# Patient Record
Sex: MALE | Race: WHITE
[De-identification: names, ages, dates, MRNs, and addresses within clinical notes are randomized per-mention and may not be internally consistent; named-entity substitution may affect disease eponyms.]

---

## 2017-03-27 ENCOUNTER — HOSPITAL ENCOUNTER (EMERGENCY)
Dept: HOSPITAL 80 - FED | Age: 77
Discharge: HOME | End: 2017-03-27
Payer: COMMERCIAL

## 2017-03-27 VITALS — TEMPERATURE: 98.6 F

## 2017-03-27 VITALS
DIASTOLIC BLOOD PRESSURE: 89 MMHG | SYSTOLIC BLOOD PRESSURE: 161 MMHG | OXYGEN SATURATION: 95 % | RESPIRATION RATE: 16 BRPM | HEART RATE: 64 BPM

## 2017-03-27 DIAGNOSIS — J20.9: Primary | ICD-10-CM

## 2017-03-27 DIAGNOSIS — F17.210: ICD-10-CM

## 2017-03-27 NOTE — EDPHY
H & P


Stated Complaint: Cough,nasal congestion x 2 days


Time Seen by Provider: 03/27/17 10:59


HPI/ROS: 





CHIEF COMPLAINT:  URI symptoms x2 days





HISTORY OF PRESENT ILLNESS:  76-year-old immunocompetent male, daily smoker, no 

history of chronic pulmonary or cardiac disease complaining 2 days of nasal 

congestion, sore throat, nonproductive cough, myalgias, subjective fever.  No 

chest pain.  No back pain.  No abdominal pain. No nausea or vomiting. No rash. 

No international travel.





PRIMARY CARE PROVIDER:  Dr. Femi Metzger





REVIEW OF SYSTEMS:


A ten point review of systems was performed and is negative with the exception 

of the items mentioned in the HPI








PAST MEDICAL & SURGICAL  HISTORY:  No pertinent medical or surgical history    





SOCIAL HISTORY:  Daily cigarette smoker











************


PHYSICAL EXAM





(Prior to examination, patient consented to physical exam, hands were washed 

and my usual and customary physical exam procedures followed)


1) GENERAL: Well-developed, well-nourished, alert and oriented.  Appears 

nontoxic .


2) HEAD: Normocephalic, atraumatic


3) HEENT: Pupils equal, round, reactive to light bilaterally.  Sclera 

anicteric.  Nasopharynx, oropharynx, clear, no lesions. No tonsillar 

enlargement or tonsillar exudate uvula midline. Ears bilaterally with normal 

tympanic membranes.


4) NECK: Full range of motion, no meningeal signs.


5) LUNGS: Clear auscultation bilaterally, no wheezes, no rhonchi, no 

retractions.   


6) HEART: Regular rate and rhythm, no murmur, no heave, no gallop.


7) ABDOMEN: No guarding, no rebound, no focal tenderness, negative McBurney's, 

negative Orellana's, negative Rovsing's, negative peritoneal sign,


8) MUSCULOSKELETAL: Moving all extremities, no focal areas of tenderness, no 

obvious trauma.  No peripheral edema or discoloration.


9) BACK: No CVA tenderness,. 


10) SKIN: No rash, no petechiae. 


11) Psychiatric:  Patient is oriented X 3, there is no agitation.








***************





DIFFERENTIAL DIAGNOSIS:  in no particular include but limited to pneumonia, 

bronchitis, influenza 








- Personal History


Current Tetanus Diphtheria and Acellular Pertussis (TDAP): Unsure





- Medical/Surgical History


Hx Asthma: No


Hx Chronic Respiratory Disease: No


Hx Diabetes: No


Hx Cardiac Disease: No


Hx Renal Disease: No


Hx Cirrhosis: No


Hx Alcoholism: No


Hx HIV/AIDS: No


Hx Splenectomy or Spleen Trauma: No


Other PMH: PSH: HERNIA,GALLSTONES.  PMH: GASTRIC ULCERS,PROSTATE





- Social History


Smoking Status: Current every day smoker


Constitutional: 


 Initial Vital Signs











Temperature (C)  37 C   03/27/17 10:17


 


Heart Rate  61   03/27/17 10:17


 


Respiratory Rate  20   03/27/17 10:17


 


Blood Pressure  143/99 H  03/27/17 10:17


 


O2 Sat (%)  93   03/27/17 10:17








 











O2 Delivery Mode               Room Air














Allergies/Adverse Reactions: 


 





No Known Allergies Allergy (Verified 03/27/17 10:16)


 








Home Medications: 














 Medication  Instructions  Recorded


 


AZITHROMYCIN [Z-PACK] 500 mg PO DAILY #1 packet 03/27/17


 


Albuterol [Proventil Inhaler HFA 1 - 2 puffs IH Q4PRN PRN #1 mdi 03/27/17





(*)]  


 


Benzonatate [Tessalon Pearles (RX)] 200 mg PO TID PRN #15 cap 03/27/17














Medical Decision Making





- Diagnostics


Imaging: 





Chest, Two Views - March 27, 2017 at 1103 hours 


 


 History:  Chest pain. 


 


 Comparison: October 2014 


 


 Findings: Cardiac silhouette is within normal range. Bilateral peribronchial 

thickening. 


Atherosclerotic tortuous aorta. Hyperinflation of the lungs. Increased 

interstitial markings 


throughout both lungs again noted. Sclerotic lesion in the proximal left 

humerus is stable since 


the previous study, possibly representing bone infarct or enchondroma. 


 


 


Impression: 


1. Atherosclerotic tortuous aorta. 


2. COPD. 


3. Bronchitis. 


4. Increased interstitial markings in both lungs, which may represent mild 

interstitial pulmonary 


edema or interstitial pneumonitis versus interstitial pulmonary fibrosis. 

Consider CT chest imaging 


if clinically indicated. 


 


 


               Dictated By: José Miguel Bettencourt 





Images reviewed by myself


ED Course/Re-evaluation: 





Influenza swab negative. Chest x-ray shows no definitive infiltrate.  Re-

evaluation most recently at 12:20 p.m..  He is breathing comfortably   

Maintaining normal saturations.  I think the patient can be discharged.  Will 

plan on treatment for coverage for community-acquired pneumonia .  No history 

of hospitalization recently.  Usual and customary URI precautions instructions 

provided





- Data Points


Laboratory Results: 


 











  03/27/17





  10:25


 


Influenza Typ A,B (DFA)  NEGATIVE FOR FLU 





   (NEGATIVE) 














Departure





- Departure


Disposition: Home, Routine, Self-Care


Clinical Impression: 


 Bronchitis





Condition: Good


Instructions:  Acute Bronchitis (ED)


Additional Instructions: 


 Return to the emergency department immediately for change in breathing habits, 

change in voice, change in swallowing habits, change in mental status, or any 

other symptoms that concern you.


Referrals: 


Femi Metzger MD [Primary Care Provider] - 1 day without fail


Prescriptions: 


Albuterol [Proventil Inhaler HFA (*)] 1 - 2 puffs IH Q4PRN PRN #1 mdi


 PRN Reason: Cough, Moderate


AZITHROMYCIN [Z-PACK] 500 mg PO DAILY #1 packet


Benzonatate [Tessalon Pearles (RX)] 200 mg PO TID PRN #15 cap


 PRN Reason: Cough, Moderate

## 2018-01-17 ENCOUNTER — HOSPITAL ENCOUNTER (EMERGENCY)
Dept: HOSPITAL 80 - FED | Age: 78
Discharge: HOME | End: 2018-01-17
Payer: COMMERCIAL

## 2018-01-17 VITALS
RESPIRATION RATE: 20 BRPM | OXYGEN SATURATION: 91 % | TEMPERATURE: 98.2 F | DIASTOLIC BLOOD PRESSURE: 78 MMHG | SYSTOLIC BLOOD PRESSURE: 158 MMHG

## 2018-01-17 VITALS — HEART RATE: 54 BPM

## 2018-01-17 DIAGNOSIS — J11.1: Primary | ICD-10-CM

## 2018-01-17 DIAGNOSIS — F17.200: ICD-10-CM

## 2018-01-17 DIAGNOSIS — E86.9: ICD-10-CM

## 2018-01-17 LAB — PLATELET # BLD: 125 10^3/UL (ref 150–400)

## 2018-01-17 PROCEDURE — 99284 EMERGENCY DEPT VISIT MOD MDM: CPT

## 2018-01-17 PROCEDURE — 96374 THER/PROPH/DIAG INJ IV PUSH: CPT

## 2018-01-17 PROCEDURE — 71045 X-RAY EXAM CHEST 1 VIEW: CPT

## 2018-01-17 NOTE — EDPHY
H & P


Time Seen by Provider: 01/17/18 09:17


HPI/ROS: 





Chief complaint.  Cough congestion





HPI.  77-year-old male presents emergency department with cough productive of 

phlegm.  Began yesterday.  He does not think fever.  Vomiting yesterday.  

Somewhat achy.  No chest discomfort or shortness of breath.  No abdominal pain.

  Some upper airway congestion.





ROS


Constitutional.  no fever/chills, no weakness


Eyes.  no problems with vision


ENT.  no sore throat, no nasal drainage


Cardiovascular.  no chest pain


Respiratory.  Cough


Abdominal.  Vomiting


.  no problems urinating


MS.  no calf pain/swelling, no neck/back pain, no joint pain


Skin.  no rash


Lymph.  no swollen glands


Neuro.  no headache, no dizziness, no difficulty walking or with speech


Past Medical/Surgical History: 





Past medical history significant for hernia, gallstones, peptic ulcer disease, 

possible prostate cancer


Social History: 





Single, daily smoker, no alcohol


Smoking Status: Current every day smoker


Physical Exam: 





General Appearance:  Alert well-developed male mild distress. Initial temp 

37.9degrees and O2 saturation 92% on room air


Eyes: Pupils equal and round no pallor or injection.


ENT, pharynx slightly injected without exudate.  Mucous membranes moist


Respiratory:  No retractions.  Mild inspiratory expiratory rhonchi


Cardiovascular: Regular rate and rhythm.


Gastrointestinal:   Abdomen is soft and nontender, no masses, bowel sounds 

normal.


Neurological:  Awake and alert, sensory and motor exams grossly normal.


Skin: Warm and dry, no rashes.


Musculoskeletal:  Neck is supple nontender.


Extremities  symmetrical, full range of motion.


Psychiatric: Patient is oriented X 3, there is no agitation.


Constitutional: 


 Initial Vital Signs











Temperature (C)  37.9 C   01/17/18 09:23


 


Heart Rate  54 L  01/17/18 09:23


 


Blood Pressure  155/80 H  01/17/18 09:23


 


O2 Sat (%)  92   01/17/18 09:23








 











O2 Delivery Mode               Room Air














Allergies/Adverse Reactions: 


 





No Known Allergies Allergy (Verified 03/27/17 10:16)


 








Home Medications: 














 Medication  Instructions  Recorded


 


Albuterol [Proventil Inhaler HFA 1 - 2 puffs IH Q4PRN PRN #1 mdi 03/27/17





(*)]  


 


Benzonatate [Tessalon Pearles] 200 mg PO TID PRN #15 cap 03/27/17


 


Oseltamivir Phosphate [Tamiflu 75 75 mg PO BID #10 cap 01/17/18





mg (*)]  














Medical Decision Making





- Diagnostics


Imaging Results: 


 Imaging Impressions





Chest X-Ray  01/17/18 09:26


Impression:


1. Decreased inspiration with increase in interstitial markings mid to lower 

lungs along with some prominence of pulmonary vascularity centrally and mild 

increase in heart size. Consider fluid overload/early CHF.








Chest x-ray interpreted by me shows diffuse increase in interstitial markings 

possibly secondary to CHF.  No obvious pneumonia


ED Course/Re-evaluation: 





Flu swab is positive. He is given Tamiflu in the emergency department





Patient's O2 saturation will drop down into the 86 87% range but then comes 

back up to 92-93%.  He does not have any symptoms of subjective shortness of 

breath.





Patient and I discussed treatment plan including recommendation for 

hospitalization due to abnormal EKG and hypoxia.  He expresses understanding 

and agreement but refuses admission.  He is encouraged to follow up with Dr. Metzger his PCP


Differential Diagnosis: 





I have considered pneumonia, pneumothorax, influenza a





- Data Points


Laboratory Results: 


 Laboratory Results





 01/17/18 09:38 





 01/17/18 09:38 





 











  01/17/18 01/17/18 01/17/18





  09:38 09:38 09:20


 


WBC    4.91 10^3/uL 10^3/uL  





    (3.80-9.50)  


 


RBC    4.66 10^6/uL 10^6/uL  





    (4.40-6.38)  


 


Hgb    14.3 g/dL g/dL  





    (13.7-17.5)  


 


Hct    41.1 % %  





    (40.0-51.0)  


 


MCV    88.2 fL fL  





    (81.5-99.8)  


 


MCH    30.7 pg pg  





    (27.9-34.1)  


 


MCHC    34.8 g/dL g/dL  





    (32.4-36.7)  


 


RDW    13.8 % %  





    (11.5-15.2)  


 


Plt Count    125 10^3/uL L 10^3/uL  





    (150-400)  


 


MPV    10.6 fL fL  





    (8.7-11.7)  


 


Neut % (Auto)    69.9 % %  





    (39.3-74.2)  


 


Lymph % (Auto)    19.3 % %  





    (15.0-45.0)  


 


Mono % (Auto)    10.2 % %  





    (4.5-13.0)  


 


Eos % (Auto)    0.0 % L %  





    (0.6-7.6)  


 


Baso % (Auto)    0.4 % %  





    (0.3-1.7)  


 


Nucleat RBC Rel Count    0.0 % %  





    (0.0-0.2)  


 


Absolute Neuts (auto)    3.43 10^3/uL 10^3/uL  





    (1.70-6.50)  


 


Absolute Lymphs (auto)    0.95 10^3/uL L 10^3/uL  





    (1.00-3.00)  


 


Absolute Monos (auto)    0.50 10^3/uL 10^3/uL  





    (0.30-0.80)  


 


Absolute Eos (auto)    0.00 10^3/uL L 10^3/uL  





    (0.03-0.40)  


 


Absolute Basos (auto)    0.02 10^3/uL 10^3/uL  





    (0.02-0.10)  


 


Absolute Nucleated RBC    0.00 10^3/uL 10^3/uL  





    (0-0.01)  


 


Immature Gran %    0.2 % %  





    (0.0-1.1)  


 


Immature Gran #    0.01 10^3/uL 10^3/uL  





    (0.00-0.10)  


 


Sodium  140 mEq/L mEq/L    





   (135-145)   


 


Potassium  4.2 mEq/L mEq/L    





   (3.5-5.2)   


 


Chloride  104 mEq/L mEq/L    





   ()   


 


Carbon Dioxide  22 mEq/l mEq/l    





   (22-31)   


 


Anion Gap  14 mEq/L mEq/L    





   (8-16)   


 


BUN  15 mg/dL mg/dL    





   (7-23)   


 


Creatinine  1.3 mg/dL mg/dL    





   (0.7-1.3)   


 


Estimated GFR  54     





    


 


Glucose  88 mg/dL mg/dL    





   ()   


 


Calcium  8.6 mg/dL mg/dL    





   (8.5-10.4)   


 


Nasal Influenza A PCR      NEGATIVE FOR FLU A 





     (NEGATIVE) 


 


Nasal Influenza B PCR      FLU B DETECTED 





     (NEGATIVE) 











Medications Given: 


 








Discontinued Medications





Sodium Chloride (Ns)  1,000 mls @ 0 mls/hr IV EDNOW ONE; Wide Open


   PRN Reason: Protocol


   Stop: 01/17/18 09:27


   Last Admin: 01/17/18 09:53 Dose:  1,000 mls


Ondansetron HCl (Zofran)  4 mg IVP EDNOW ONE


   Stop: 01/17/18 09:27


   Last Admin: 01/17/18 09:54 Dose:  4 mg


Oseltamivir Phosphate (Tamiflu)  75 mg PO EDNOW ONE


   Stop: 01/17/18 11:44


   Last Admin: 01/17/18 11:48 Dose:  75 mg








Departure





- Departure


Disposition: Home, Routine, Self-Care


Clinical Impression: 


 Influenza





Condition: Good


Instructions:  Influenza (ED)


Additional Instructions: 


20 of fluids and stay hydrated.  Tylenol 650 mg every 6 hr for fever 4-6 hours 

as needed for fever it ibuprofen


Referrals: 


Patient,NotPresent [Unknown] - As per Instructions


Prescriptions: 


Oseltamivir Phosphate [Tamiflu 75 mg (*)] 75 mg PO BID #10 cap

## 2018-01-31 ENCOUNTER — HOSPITAL ENCOUNTER (EMERGENCY)
Dept: HOSPITAL 80 - FED | Age: 78
LOS: 1 days | Discharge: HOME | End: 2018-02-01
Payer: COMMERCIAL

## 2018-01-31 DIAGNOSIS — F41.9: ICD-10-CM

## 2018-01-31 DIAGNOSIS — G47.00: Primary | ICD-10-CM

## 2018-01-31 LAB — PLATELET # BLD: 262 10^3/UL (ref 150–400)

## 2018-01-31 NOTE — EDPHY
HPI/HX/ROS/PE/MDM


Narrative: 





CHIEF COMPLAINT: Insomnia, "I want to sleep"





HPI: The patient is a 76 y/o male arriving via EMS complaining of insomnia for 

the last 6 days. He has been using Benadryl to help him sleep, but is only 

sleeping for 1-2 hours per night. He wakes up because "my anxiety won't let me 

sleep." He describes pacing through the hallways. Right now he feels mentally 

foggy and just wants to sleep. He has been evaluated at [facility] for this 4 

times in the last week, most recently today. He was prescribed Ativan and 

Trazodone for his insomnia, but has only tried the Trazodone and feels it makes 

him drowsy, which he doesn't like. He was referred by [facility] for admission to 

the hospital, but he can't tell me what they wanted us to admit him for. He 

doesn't want any medication from me that will make him drowsy and is asking for 

"something to knock me out."





He also mentions a mild cough for several weeks. He was evaluated here for that 

2 weeks ago and had a positive flu swab. He was offered admission for 

intermittent hypoxemia around 85-86% and an abnormal EKG, but declined. No copy 

of this EKG is available in our system for review. He was prescribed Tamiflu 

and given standard flu care and follow up instructions. 





REVIEW OF SYSTEMS:


Aside from elements discussed in the HPI, a comprehensive 10-point review of 

systems was reviewed and is negative.





PMH: Insomnia, hernia, gallstones, peptic ulcer disease, possible prostate 

cancer.





SOCIAL HISTORY: Lives in [place] at [facility]. Daily 

smoker. Single. No alcohol use. Lavinia Morelos





Prior medical records reviewed including ED visit 1/17/18 for cough. 





PHYSICAL EXAM:


General:Patient is alert, in no acute distress.


ENT:Eyes are normal to inspection.  ENT inspection normal.


Neck: Normal inspection.  Full range of motion.


Respiratory:No respiratory distress.  Breath sounds normal bilaterally.


Cardiovascular: Regular rate and rhythm.  Strong peripheral pulses.  Normal cap 

refill.


Abdomen:The abdomen is nontender to palpation. There are no peritoneal signs. 

There are normal bowel sounds.


Back: Normal to inspection.  No tenderness to palpation.


Skin: Normal color.  No rash.  Warm and dry.


Extremities: Normal appearance.  Full range of motion.


Neuro: Oriented x3.  Normal motor function.  Normal sensory function. (Luis F Heard)


ED Course: 





On reassessment, patient does not want to go home and try taking his Ativan for 

anxiety to help him sleep. He tells me he has a  knife at home that he 

is visualizing stabbing himself with in the abdomen. He is open to speaking 

with a mental health evaluator tonight. Standard psychiatric labs ordered plus 

EKG and troponin to rule out obvious cardiac causes for patient's symptoms. He 

could also be experiencing sleep apnea causing his nocturnal symptoms. 





The 12 lead EKG was interpreted by myself. See hard copy and/or "tracemaster" 

electronic copy for interpretation. (Luis F Heard)





0617:  This patient has been re-evaluated this time.  He slept here in the 

emergency room all night.  He now reports to me the got a good night sleep any 

feels very well. He states he does not want harm self or anybody else.  He 

states he is feeling very happened he got to sleep after 6 days of very little 

sleep.  He earlier in the course of the hospital said that he wanted to stab 

himself in the abdomen with a knife if he could not get to sleep however 

reports to me that he does not want to do that.


He states since sleeping all night feels very well he would like to go home.  

He states that he would like to go home and rest.  He denies wanting to hurt 

himself or anybody else.  Denies being suicidal.  He contracts for safety.


I will allow her to go home however he understands that if he has any further 

symptoms of thoughts want hurt himself or anybody else or has trouble sleeping 

or feels anxious she should return emergency room. (Jed Moore)





- Data Points


Laboratory Results: 





 Laboratory Results





 01/31/18 18:40 





 01/31/18 18:40 





 











  01/31/18 01/31/18 01/31/18





  20:16 18:40 18:40


 


WBC      6.50 10^3/uL 10^3/uL





     (3.80-9.50) 


 


RBC      4.37 10^6/uL L 10^6/uL





     (4.40-6.38) 


 


Hgb      13.3 g/dL L g/dL





     (13.7-17.5) 


 


Hct      37.9 % L %





     (40.0-51.0) 


 


MCV      86.7 fL fL





     (81.5-99.8) 


 


MCH      30.4 pg pg





     (27.9-34.1) 


 


MCHC      35.1 g/dL g/dL





     (32.4-36.7) 


 


RDW      13.3 % %





     (11.5-15.2) 


 


Plt Count      262 10^3/uL 10^3/uL





     (150-400) 


 


MPV      10.0 fL fL





     (8.7-11.7) 


 


Neut % (Auto)      65.2 % %





     (39.3-74.2) 


 


Lymph % (Auto)      22.6 % %





     (15.0-45.0) 


 


Mono % (Auto)      11.5 % %





     (4.5-13.0) 


 


Eos % (Auto)      0.0 % L %





     (0.6-7.6) 


 


Baso % (Auto)      0.5 % %





     (0.3-1.7) 


 


Nucleat RBC Rel Count      0.0 % %





     (0.0-0.2) 


 


Absolute Neuts (auto)      4.24 10^3/uL 10^3/uL





     (1.70-6.50) 


 


Absolute Lymphs (auto)      1.47 10^3/uL 10^3/uL





     (1.00-3.00) 


 


Absolute Monos (auto)      0.75 10^3/uL 10^3/uL





     (0.30-0.80) 


 


Absolute Eos (auto)      0.00 10^3/uL L 10^3/uL





     (0.03-0.40) 


 


Absolute Basos (auto)      0.03 10^3/uL 10^3/uL





     (0.02-0.10) 


 


Absolute Nucleated RBC      0.00 10^3/uL 10^3/uL





     (0-0.01) 


 


Immature Gran %      0.2 % %





     (0.0-1.1) 


 


Immature Gran #      0.01 10^3/uL 10^3/uL





     (0.00-0.10) 


 


Sodium    139 mEq/L mEq/L  





    (135-145)  


 


Potassium    4.1 mEq/L mEq/L  





    (3.5-5.2)  


 


Chloride    103 mEq/L mEq/L  





    ()  


 


Carbon Dioxide    23 mEq/l mEq/l  





    (22-31)  


 


Anion Gap    13 mEq/L mEq/L  





    (8-16)  


 


BUN    9 mg/dL mg/dL  





    (7-23)  


 


Creatinine    1.0 mg/dL mg/dL  





    (0.7-1.3)  


 


Estimated GFR    > 60   





    


 


Glucose    85 mg/dL mg/dL  





    ()  


 


Calcium    9.1 mg/dL mg/dL  





    (8.5-10.4)  


 


Troponin I    < 0.012 ng/mL ng/mL  





    (0.000-0.034)  


 


Urine Opiates Screen  NON-NEGATIVE  H     





   (NEGATIVE)   


 


Urine Barbiturates  NEGATIVE     





   (NEGATIVE)   


 


Ur Phencyclidine Scrn  NEGATIVE     





   (NEGATIVE)   


 


Ur Amphetamine Screen  NEGATIVE     





   (NEGATIVE)   


 


U Benzodiazepines Scrn  NEGATIVE     





   (NEGATIVE)   


 


Urine Cocaine Screen  NEGATIVE     





   (NEGATIVE)   


 


U Marijuana (THC) Screen  NON-NEGATIVE  H     





   (NEGATIVE)   














General


Time Seen by Provider: 01/31/18 18:04


Initial Vital Signs: 





 Initial Vital Signs











Temperature (C)  36.8 C   01/31/18 17:57


 


Heart Rate  70   01/31/18 17:57


 


Respiratory Rate  18   01/31/18 17:57


 


Blood Pressure  175/95 H  01/31/18 17:57


 


O2 Sat (%)  93   01/31/18 17:57








 











O2 Delivery Mode               Room Air














Allergies/Adverse Reactions: 


 





No Known Allergies Allergy (Verified 03/27/17 10:16)


 








Home Medications: 














 Medication  Instructions  Recorded


 


Ativan  01/31/18


 


Benadryl  01/31/18


 


traZODone  01/31/18














Departure





- Departure


Disposition: Home, Routine, Self-Care


Clinical Impression: 


 Anxiety





Insomnia


Qualifiers:


 Insomnia type: unspecified Qualified Code(s): G47.00 - Insomnia, unspecified





Condition: Good


Instructions:  Anxiety (ED), Insomnia (ED)


Additional Instructions: 


1.  Return immediately to emergency room if you have thoughts of wanting to 

harm herself or anybody else.


Referrals: 


Patient,NotPresent [Unknown] - As per Instructions


Report Scribed for: Luis F Heard


Report Scribed by: Brionna Faustin


Date of Report: 01/31/18


Time of Report: 18:15


Physician Review and Approval Statement: Portions of this note were transcribed 

by an ED scribe.  I personally performed the history, physical exam, and 

medical decision making; and confirm the accuracy of the information in the 

transcribed note.

## 2018-01-31 NOTE — CPEKG
Heart Rate: 61

RR Interval: 984

P-R Interval: 160

QRSD Interval: 84

QT Interval: 416

QTC Interval: 419

P Axis: 64

QRS Axis: -38

T Wave Axis: 40

EKG Severity - OTHERWISE NORMAL ECG -

EKG Impression: SINUS RHYTHM

EKG Impression: LEFT AXIS DEVIATION

Electronically Signed By: Jennifer Orantes 01-Feb-2018 23:05:54

## 2018-02-01 VITALS
OXYGEN SATURATION: 96 % | HEART RATE: 74 BPM | TEMPERATURE: 97.9 F | DIASTOLIC BLOOD PRESSURE: 74 MMHG | SYSTOLIC BLOOD PRESSURE: 133 MMHG | RESPIRATION RATE: 16 BRPM

## 2018-02-19 ENCOUNTER — HOSPITAL ENCOUNTER (EMERGENCY)
Dept: HOSPITAL 80 - FED | Age: 78
Discharge: HOME | End: 2018-02-19
Payer: COMMERCIAL

## 2018-02-19 VITALS
RESPIRATION RATE: 18 BRPM | OXYGEN SATURATION: 94 % | SYSTOLIC BLOOD PRESSURE: 162 MMHG | HEART RATE: 70 BPM | TEMPERATURE: 97.9 F | DIASTOLIC BLOOD PRESSURE: 96 MMHG

## 2018-02-19 DIAGNOSIS — F17.200: ICD-10-CM

## 2018-02-19 DIAGNOSIS — F41.9: Primary | ICD-10-CM

## 2018-02-19 NOTE — EDPHY
H & P


Stated Complaint: Insomnia x 1 month


Time Seen by Provider: 02/19/18 16:23





- Personal History


Current Tetanus Diphtheria and Acellular Pertussis (TDAP): Unsure





- Medical/Surgical History


Hx Asthma: No


Hx Chronic Respiratory Disease: No


Hx Diabetes: No


Hx Cardiac Disease: No


Hx Renal Disease: No


Hx Cirrhosis: No


Hx Alcoholism: No


Hx HIV/AIDS: No


Hx Splenectomy or Spleen Trauma: No


Other PMH: PSH: HERNIA,.  PMH: GASTRIC ULCERS,PROSTATE, Anxiety and Insomnia





- Social History


Smoking Status: Current every day smoker


Constitutional: 


 Initial Vital Signs











Temperature (C)  36.6 C   02/19/18 15:21


 


Heart Rate  70   02/19/18 15:21


 


Respiratory Rate  18   02/19/18 15:21


 


Blood Pressure  162/96 H  02/19/18 15:21


 


O2 Sat (%)  94   02/19/18 15:21








 











O2 Delivery Mode               Room Air














Allergies/Adverse Reactions: 


 





No Known Allergies Allergy (Verified 03/27/17 10:16)


 








Home Medications: 














 Medication  Instructions  Recorded


 


Ativan  01/31/18


 


Benadryl  01/31/18


 


traZODone  01/31/18














Medical Decision Making


ED Course/Re-evaluation: 


CHIEF COMPLAINT:  Insomnia x 1 month





HISTORY OF PRESENT ILLNESS: 


The patient is a 76 y/o male with a history of anxiety complaining of insomnia 

and anxiety for one month. On 01/29/18, the patient was seen in this ED for 

similar symptoms. He had a an EKG and labs preformed during this visit. He was 

also evaluated my mental health as he stated he "wanted to stab himself in the 

abdomen". He has been taking 1mg PO Lorazepam at night. It was helping him 

sleep but he stopped taking it several nights ago as it made him "groggy". 2 

nights ago he took 3 diphenhydramine which helped him sleep. Last night the 

diphenhydramine did not help his insomnia. Today he feels "stressed" but does 

not know why. He is still feeling anxious.





REVIEW OF SYSTEMS:  





A 10 point review of systems was performed and is negative with the exception 

of the elements mentioned in the history of present illness.





PHYSICAL EXAM:  





HR, BP, O2 Sat, RR.  Temp noted


General Appearance:  Alert, well hydrated, appropriate, and non-toxic appearing.


Head:  Atraumatic without scalp tenderness or obvious injury


Eyes:  Pupils equal, round, reactive to light and accommodation, EOMI, no trauma

, no injection.


Ears:  Clear bilaterally, no perforation, normal landmarks


Nose:  Atraumatic, no rhinorrhea, clear.


Throat: Mucus membranes moist.


Neck:  Supple, nontender, no lymphadenopathy.


Respiratory:  No retractions, no distress, no wheezes, and no accessory muscle 

use.  Lungs are clear to auscultation bilaterally.


Cardiovascular:  Regular rate and rhythm, no murmurs, rubs, or gallops. Good 

capillary refill all extremities.


Gastrointestinal:  Abdomen is soft, nontender, non-distended, no masses, no 

rebound, no guarding, no peritoneal signs.


Musculoskeletal:  Normal active ROM of all extremities, atraumatic.


Neurological:  Alert, appropriate, and interactive. Non-focal neuro.


Skin:  No rashes, good turgor, no nodules on palpation.





Past medical history: Anxiety, insomnia, hernia, gallstones, peptic ulcer 

disease


Past surgical history: Denies


Family history: Denies


Social history: Lives in [place], single, daily smoker, followed by Dr. Morelos at [facility]





DIFFERENTIAL DIAGNOSIS:


The differential diagnosis for the patient's insomnia included but was not 

limited to insomnia, anxiety, functional and major depression, situational 

depression, medication side effect, drugs, and alcohol abuse.





MEDICAL DECISION MAKING: 


The patient is a 76 y/o male with a history of insomnia and anxiety presenting 

with difficulty sleeping for 1 month that exacerbated 2 nights ago. He has been 

unable to sleep even while taking 3 diphenhydramine.  His physical exam is 

normal; plan on consulting case management.





1712: Consulted with case management, they have advised the patient to take his 

Ativan as prescribed and [facility].





Reassessed patient and discussed case management plan. Return precautions 

provided; patient is comfortable with this plan. 





Departure





- Departure


Disposition: Home, Routine, Self-Care


Clinical Impression: 


 Anxiety





Condition: Good


Instructions:  Anxiety (ED)


Additional Instructions: 


1. Call [facility] tomorrow morning and see if they can get you an 

appointment tomorrow to discuss alternative sleep aid medications or therapy. 


2. Take Ativan as previously prescribed.


3. Return to the ED if you experience chest pain, shortness of breath, or fever 


Referrals: 


Femi Metzger MD [Primary Care Provider] - As per Instructions


[facility],. [Clinic] - As per Instructions


Report Scribed for: David Orta


Report Scribed by: Elise Montaño


Date of Report: 02/19/18


Time of Report: 16:28

## 2018-02-19 NOTE — ASMTCMCOM
CM Note

 

CM Note                       

Notes:

Pt here for insomnia and anxiety. Pt seen in the ED 1/31 & 1/17 for flu sx,insomnia, anxiety, and 

SI (due to insomnia). Pt is followed by Femi Metzger at [facility] and last time he was seen 

was 2/7/18; pt provided a Rxn for Ativan on 2/13 and given 4 tabs; instructed to take 1 tab at HS 

PRN. Pt has one tab left and states "it works but it makes me too groggy the next day."



  Pt requesting a different medication for helping him sleep. We discussed that he should follow-up 


with his PCP for new medication changes. Pt agreeable to taking his tab of Ativan (or only 1/2 

tab) and has an appt this Wed. 2/21 with Femi Metzger but is going to call [facility] tomorrow 


morning to see if they can get him in tomorrow. This CM spoke with Kaila, a clinician at [facility] and she is aware patient may call tomorrow for a sooner appt. 



Pt also provided information on [facility] walk-in crisis center and their Crisis 

hotline #. Pt states he will consider going to the crisis center and calling their hotline.



Pt mentioned that he had to rent a car in order to get to the ED today.  



CM available for further assistance if needed.  

 

Date Signed:  02/19/2018 05:29 PM

Electronically Signed By:Marguerite Da Silva RN

## 2018-02-19 NOTE — ASDISCHSUM
----------------------------------------------

Discharge Information

----------------------------------------------

Plan Status:Home with No Needs                       Medically Cleared to Leave:

Discharge Date:02/19/2018 05:17 PM                   CM D/C Disposition:Home, Routine, Self-Care

ADT D/C Disposition:Home, Routine, Self-Care         Projected Discharge Date:02/19/2018 05:17 PM

Transportation at D/C:Self                           Discharge Delay Reason:

Follow-Up Date:02/19/2018 05:17 PM                   Discharge Slot:

Final Diagnosis:

----------------------------------------------

Placement Information

----------------------------------------------

----------------------------------------------

Patient Contact Information

----------------------------------------------

Contact Name:JOSÉ MIGUELNONE                               Relationship:

Address:                                             Home Phone:

                                                     Work Phone:

City:                                                Alternate Phone:

State/Zip Code:                                      Email:

----------------------------------------------

Financial Information

----------------------------------------------

Financial Class:Medicare

Primary Plan Desc:MEDICARE OUTPATIENT                Primary Plan Number:[id number]

Secondary Plan Desc:MEDICA[employer] [place]      Secondary Plan Number:[id number]

 

 

----------------------------------------------

Assessment Information

----------------------------------------------

----------------------------------------------

[facility] CM Progress Note

----------------------------------------------

CM Note

 

CM Note                       

Notes:

Pt here for insomnia and anxiety. Pt seen in the ED 1/31 & 1/17 for flu sx,insomnia, anxiety, and 

SI (due to insomnia). Pt is followed by Femi Metzger at [facility] and last time he was seen 

was 2/7/18; pt provided a Rxn for Ativan on 2/13 and given 4 tabs; instructed to take 1 tab at HS 

PRN. Pt has one tab left and states "it works but it makes me too groggy the next day."



  Pt requesting a different medication for helping him sleep. We discussed that he should follow-up 


with his PCP for new medication changes. Pt agreeable to taking his tab of Ativan (or only 1/2 

tab) and has an appt this Wed. 2/21 with Femi Metzger but is going to call [facility] tomorrow 


morning to see if they can get him in tomorrow. This CM spoke with Kaila, a clinician at [facility] 


[facility] and she is aware patient may call tomorrow for a sooner appt. 



Pt also provided information on [facility] walk-in crisis center and their Crisis 

hotline #. Pt states he will consider going to the crisis center and calling their hotline.



Pt mentioned that he had to rent a car in order to get to the ED today.  



CM available for further assistance if needed.  

 

Date Signed:  02/19/2018 05:29 PM

Electronically Signed By:Marguerite Da Silva RN

 

 

----------------------------------------------

Intervention Information

----------------------------------------------